# Patient Record
(demographics unavailable — no encounter records)

---

## 2024-12-02 NOTE — BEGINNING OF VISIT
[Medical reason not done] : Medical reason not done [0] : 2) Feeling down, depressed, or hopeless: Not at all (0) [[id number]] : 0 [Pain Scale: ___] : On a scale of 1-10, today the patient's pain is a(n) [unfilled]. [Future Reassessment of Pain Scale] : Future reassessment of pain scale [Former] : Former [Date Discussed (MM/DD/YY): ___] : Discussed: [unfilled] [Patient/Caregiver not ready to engage] : Patient/Caregiver not ready to engage

## 2024-12-02 NOTE — ADDENDUM
[FreeTextEntry1] : Documented by Patricio Downing acting as scribe for Dr. Herring on  12/02/2024.    All Medical record entries made by the Scribe were at my, Dr. Herring's, direction and personally dictated by me on  12/02/2024. I have reviewed the chart and agree that the record accurately reflects my personal performance of the history, physical exam, assessment and plan. I have also personally directed, reviewed, and agreed with the discharge instructions.

## 2024-12-02 NOTE — RESULTS/DATA
[FreeTextEntry1] : ACC: [id number]     EXAM:  US DPLX LWR EXT VEINS COMPL BI   ORDERED BY: CHARY COFFEY  PROCEDURE DATE:  11/27/2024 INTERPRETATION:  CLINICAL INFORMATION: PERIPHERAL EDEMA  COMPARISON: 01/12/2022.  TECHNIQUE: Duplex sonography of the BILATERAL LOWER extremity veins with color and spectral Doppler, with and without compression.  FINDINGS:  RIGHT: Normal compressibility of the RIGHT common femoral, femoral and popliteal veins. DVT noted in the right gastrocnemius vein.  LEFT: Normal compressibility of the LEFT common femoral, femoral and popliteal veins. Doppler examination shows normal spontaneous and phasic flow. No LEFT calf vein thrombosis is detected.  IMPRESSION: DVT noted in the right gastrocnemius vein.  No left-sided DVT. --- End of Report --- CASIMIRO RUEDA MD; Attending Radiologist This document has been electronically signed. Nov 27 2024  2:09PM ---------------------------------------------------------------------------------------------------------------------------------------------- ACC: [id number]     [facility]   ORDERED BY: CHARY COFFEY PROCEDURE DATE:  11/27/2024    INTERPRETATION:  CLINICAL INFORMATION: Confirmed DVT  COMPARISON: 9/4/2020  CONTRAST/COMPLICATIONS: IV Contrast: Omnipaque 350  81 cc administered   19 cc discarded Oral Contrast: NONE   PROCEDURE: CT Angiography of the Chest. Sagittal and coronal reformats were performed as well as 3D (MIP) reconstructions.  FINDINGS:  LUNGS AND LARGE AIRWAYS: Patent central airways. Low lung volumes with bibasilar dependent changes. No focal consolidation. PLEURA: No pleural effusion. VESSELS: Satisfactory bolus. No pulmonary emboli. Normal caliber thoracic aorta and pulmonary artery. HEART: Heart size is normal. No pericardial effusion. MEDIASTINUM AND MARIANN: No lymphadenopathy. CHEST WALL AND LOWER NECK: Within normal limits. VISUALIZED UPPER ABDOMEN: Cholelithiasis. Sleeve gastrectomy. Mild hazy density in the right upper abdominal mesentery with  mm sized lymph nodes consistent with nonspecific mesenteric panniculitis, similar appearance to prior BONES: Degenerative changes.  IMPRESSION: No evidence of pulmonary emboli or other acute finding  --- End of Report --- MARGO LANGLEY MD; Attending Radiologist This document has been electronically signed. Nov 27 2024  4:17PM

## 2024-12-02 NOTE — RESULTS/DATA
[FreeTextEntry1] : ACC: [id number]     EXAM:  US DPLX LWR EXT VEINS COMPL BI   ORDERED BY: CHARY COFFEY  PROCEDURE DATE:  11/27/2024 INTERPRETATION:  CLINICAL INFORMATION: PERIPHERAL EDEMA  COMPARISON: 01/12/2022.  TECHNIQUE: Duplex sonography of the BILATERAL LOWER extremity veins with color and spectral Doppler, with and without compression.  FINDINGS:  RIGHT: Normal compressibility of the RIGHT common femoral, femoral and popliteal veins. DVT noted in the right gastrocnemius vein.  LEFT: Normal compressibility of the LEFT common femoral, femoral and popliteal veins. Doppler examination shows normal spontaneous and phasic flow. No LEFT calf vein thrombosis is detected.  IMPRESSION: DVT noted in the right gastrocnemius vein.  No left-sided DVT. --- End of Report --- CASIMIRO RUEDA MD; Attending Radiologist This document has been electronically signed. Nov 27 2024  2:09PM ---------------------------------------------------------------------------------------------------------------------------------------------- ACC: [id number]     [facility]   ORDERED BY: CHARY COFFEY PROCEDURE DATE:  11/27/2024    INTERPRETATION:  CLINICAL INFORMATION: Confirmed DVT  COMPARISON: 9/4/2020  CONTRAST/COMPLICATIONS: IV Contrast: Omnipaque 350  81 cc administered   19 cc discarded Oral Contrast: NONE   PROCEDURE: CT Angiography of the Chest. Sagittal and coronal reformats were performed as well as 3D (MIP) reconstructions.  FINDINGS:  LUNGS AND LARGE AIRWAYS: Patent central airways. Low lung volumes with bibasilar dependent changes. No focal consolidation. PLEURA: No pleural effusion. VESSELS: Satisfactory bolus. No pulmonary emboli. Normal caliber thoracic aorta and pulmonary artery. HEART: Heart size is normal. No pericardial effusion. MEDIASTINUM AND MARIANN: No lymphadenopathy. CHEST WALL AND LOWER NECK: Within normal limits. VISUALIZED UPPER ABDOMEN: Cholelithiasis. Sleeve gastrectomy. Mild hazy density in the right upper abdominal mesentery with  mm sized lymph nodes consistent with nonspecific mesenteric panniculitis, similar appearance to prior BONES: Degenerative changes.  IMPRESSION: No evidence of pulmonary emboli or other acute finding  --- End of Report --- MARGO LANGLEY MD; Attending Radiologist This document has been electronically signed. Nov 27 2024  4:17PM

## 2024-12-02 NOTE — BEGINNING OF VISIT
[Medical reason not done] : Medical reason not done [0] : 2) Feeling down, depressed, or hopeless: Not at all (0) [[id number]] : 0 [Pain Scale: ___] : On a scale of 1-10, today the patient's pain is a(n) [unfilled]. [Future Reassessment of Pain Scale] : Future reassessment of pain scale [Former] : Former [Date Discussed (MM/DD/YY): ___] : Discussed: [unfilled] [Patient/Caregiver not ready to engage] : Patient/Caregiver not ready to engage

## 2024-12-02 NOTE — HISTORY OF PRESENT ILLNESS
[de-identified] : Mabel Gunter is a 61-yr old female with PMH of obesity, osteoarthritis of Right knee (H.A injection), right leg varicose vein (s/p Right leg phlebectomy 1/2017, right GSV radiofrequency ablation 3/23/22) diagnosed with Right LE DVT (11/27/24)  She presented to [facility] on 11/27/24 c/o right lower extremity pain. She reports recent  2 hour flight from [place] 2 weeks prior but noted that calf pain had started prior to the flight.  She noted right posterior medial calf pain with tenderness on Sunday, 11/24/24 with out acute trauma. And has since developed some redness distal to this with some mild increase warmth. Bilateral LE doppler showed DVT in the right gastrocnemius vein. CTA Neg. Started on Eliquis  Imaging/pertinent labs:  11/27/2024 US Duplex bilateral lower extremities-DVT noted in the right gastrocnemius vein. No left-sided DVT.  11/27/2024 CT Angio chest pulmonary arteries-No evidence of pulmonary emboli or other acute finding  11/27/24 CBC: WBC 5.67 Hgb 12.2 Hct 37.1           PT 11.6 INR 1.0 APTT 35.7          CMP: Na 144 K 3.8 Mg 2.1 bun 10 creat 0.92 AST 19 ALT 14 Bili 0.5 tp 6.5 alb 3.3 Ca  Patient presents for initial visit Has been on Eliquis since 11/27/24 She reports Dr. Matt Astorga? did stab phlebectomy on both legs years ago Pt reports hip, back, calf pain during trip in [place] from August 2024 - Oct 2024 While in [place] end of Oct 2024 was when the calf pain started, before her flight She notes she has gone on long car rides fairly often in the past Father with many blood clots, but had many other high risk behaviors such as smoking and drinking She was on birth control from [id number] Reports headaches and dizziness Denies unexplained weight loss Now, her calf pain is better Last Virtual colonoscopy 2020 Last mammo/sono July 2024 Last pap smear end of 11/2024  PMH: Varicose veins both legs, right knee osteoarthritis, Vit B12 def, Vit D def, basal cell carcinoma right leg PSH: Right leg phlebectomy 1/2017, gastrectomy (2012), Right total knee replacement (2018) FMH: Father with history of blood clots Social Hx: Former smoker, quit 2012, works as a nurse  HCM:  Care team: PCP:

## 2024-12-02 NOTE — HISTORY OF PRESENT ILLNESS
[de-identified] : Mabel Gunter is a 61-yr old female with PMH of obesity, osteoarthritis of Right knee (H.A injection), right leg varicose vein (s/p Right leg phlebectomy 1/2017, right GSV radiofrequency ablation 3/23/22) diagnosed with Right LE DVT (11/27/24)  She presented to [facility] on 11/27/24 c/o right lower extremity pain. She reports recent  2 hour flight from [place] 2 weeks prior but noted that calf pain had started prior to the flight.  She noted right posterior medial calf pain with tenderness on Sunday, 11/24/24 with out acute trauma. And has since developed some redness distal to this with some mild increase warmth. Bilateral LE doppler showed DVT in the right gastrocnemius vein. CTA Neg. Started on Eliquis  Imaging/pertinent labs:  11/27/2024 US Duplex bilateral lower extremities-DVT noted in the right gastrocnemius vein. No left-sided DVT.  11/27/2024 CT Angio chest pulmonary arteries-No evidence of pulmonary emboli or other acute finding  11/27/24 CBC: WBC 5.67 Hgb 12.2 Hct 37.1           PT 11.6 INR 1.0 APTT 35.7          CMP: Na 144 K 3.8 Mg 2.1 bun 10 creat 0.92 AST 19 ALT 14 Bili 0.5 tp 6.5 alb 3.3 Ca  Patient presents for initial visit Has been on Eliquis since 11/27/24 She reports Dr. Matt Astorga? did stab phlebectomy on both legs years ago Pt reports hip, back, calf pain during trip in [place] from August 2024 - Oct 2024 While in [place] end of Oct 2024 was when the calf pain started, before her flight She notes she has gone on long car rides fairly often in the past Father with many blood clots, but had many other high risk behaviors such as smoking and drinking She was on birth control from [id number] Reports headaches and dizziness Denies unexplained weight loss Now, her calf pain is better Last Virtual colonoscopy 2020 Last mammo/sono July 2024 Last pap smear end of 11/2024  PMH: Varicose veins both legs, right knee osteoarthritis, Vit B12 def, Vit D def, basal cell carcinoma right leg PSH: Right leg phlebectomy 1/2017, gastrectomy (2012), Right total knee replacement (2018) FMH: Father with history of blood clots Social Hx: Former smoker, quit 2012, works as a nurse  HCM:  Care team: PCP:

## 2024-12-02 NOTE — ASSESSMENT
[FreeTextEntry1] : 61 yr old female with PMH of obesity, right varicose vein (s/p Right leg phlebectomy 1/2017, right GSV radiofrequency ablation 3/23/22) diagnosed with Right gastrocnemius DVT / below the knee  Discussed patient's medical history extensively. She has h/o varicose veins. Father with h/o blood clots. She is a former smoker.  With no clear provoking events, this appears to be an unprovoked DVT as she had developed right calf pain prior to  her flight from [place]. Currently on Eliquis 10 mg BID, will be switched to Eliquis 5 mg ~12/9/24. Discussed that she should continue Eliquis for at least 3 months.   Plan: -hypercoagulable work up -continue Eliquis  -RTO in 3 months

## 2025-07-17 NOTE — HISTORY OF PRESENT ILLNESS
[de-identified] : Mabel Gunter is a 61-yr old female with PMH of obesity, osteoarthritis of Right knee (H.A injection), right leg varicose vein (s/p Right leg phlebectomy 1/2017, right GSV radiofrequency ablation 3/23/22) diagnosed with Right LE DVT (11/27/24)  She presented to [facility] on 11/27/24 c/o right lower extremity pain. She reports recent  2 hour flight from [place] 2 weeks prior but noted that calf pain had started prior to the flight.  She noted right posterior medial calf pain with tenderness on Sunday, 11/24/24 with out acute trauma. And has since developed some redness distal to this with some mild increase warmth. Bilateral LE doppler showed DVT in the right gastrocnemius vein. CTA Neg. Started on Eliquis  Imaging/pertinent labs:  11/27/2024 US Duplex bilateral lower extremities-DVT noted in the right gastrocnemius vein. No left-sided DVT.  11/27/2024 CT Angio chest pulmonary arteries-No evidence of pulmonary emboli or other acute finding  11/27/24 CBC: WBC 5.67 Hgb 12.2 Hct 37.1           PT 11.6 INR 1.0 APTT 35.7          CMP: Na 144 K 3.8 Mg 2.1 bun 10 creat 0.92 AST 19 ALT 14 Bili 0.5 tp 6.5 alb 3.3 Ca  Patient presents for initial visit Has been on Eliquis since 11/27/24 She reports Dr. Matt Astorga? did stab phlebectomy on both legs years ago Pt reports hip, back, calf pain during trip in [place] from August 2024 - Oct 2024 While in [place] end of Oct 2024 was when the calf pain started, before her flight She notes she has gone on long car rides fairly often in the past Father with many blood clots, but had many other high risk behaviors such as smoking and drinking She was on birth control from [id number] Reports headaches and dizziness Denies unexplained weight loss Now, her calf pain is better Last Virtual colonoscopy 2020 Last mammo/sono July 2024 Last pap smear end of 11/2024  PMH: Varicose veins both legs, right knee osteoarthritis, Vit B12 def, Vit D def, basal cell carcinoma right leg PSH: Right leg phlebectomy 1/2017, gastrectomy (2012), Right total knee replacement (2018) FMH: Father with history of blood clots Social Hx: Former smoker, quit 2012, works as a nurse  HCM:  Care team: PCP: [de-identified] : Patient presents for follow up.  She recently had a consultation for hip replacement surgery, and is seeing rheumatologist this morning She has stopped Eliquis since June 2025

## 2025-07-17 NOTE — ASSESSMENT
[FreeTextEntry1] : 61 yr old female with PMH of obesity, right varicose vein (s/p Right leg phlebectomy 1/2017, right GSV radiofrequency ablation 3/23/22) diagnosed with Right gastrocnemius DVT / below the knee She has h/o varicose veins. Father with h/o blood clots. She is a former smoker.  With no clear provoking events, this appears to be an unprovoked DVT as she had developed right calf pain prior to her flight from [place]. On Eliquis 5 mg 12/9/24-6/2025 and then self d/c'd.   Reviewed hypercoag work up: Negative for FVL / PT gene mutation, AT III, protein C & S normal Ongoing risk factors include obesity, sedentary work - sits for long hours  Plan: -Discussed resuming eliquis 5 mg BID ongoing given unprovoked event and persistent risk factors. Agreeable to resume but doesn't want to be on it life long -labs today - d-dimer, protein S antigen -planning for hip replacement - she may call within 1 month of surgery for clearance  -RTO in 1 year

## 2025-07-17 NOTE — RESULTS/DATA
[FreeTextEntry1] : ACC: [id number]     EXAM:  US DPLX LWR EXT VEINS COMPL BI   ORDERED BY: CHARY COFFEY  PROCEDURE DATE:  11/27/2024 INTERPRETATION:  CLINICAL INFORMATION: PERIPHERAL EDEMA  COMPARISON: 01/12/2022.  TECHNIQUE: Duplex sonography of the BILATERAL LOWER extremity veins with color and spectral Doppler, with and without compression.  FINDINGS:  RIGHT: Normal compressibility of the RIGHT common femoral, femoral and popliteal veins. DVT noted in the right gastrocnemius vein.  LEFT: Normal compressibility of the LEFT common femoral, femoral and popliteal veins. Doppler examination shows normal spontaneous and phasic flow. No LEFT calf vein thrombosis is detected.  IMPRESSION: DVT noted in the right gastrocnemius vein.  No left-sided DVT. --- End of Report --- CASIMIRO UREDA MD; Attending Radiologist This document has been electronically signed. Nov 27 2024  2:09PM ---------------------------------------------------------------------------------------------------------------------------------------------- ACC: [id number]     [facility]  [facility]   ORDERED BY: CHARY COFFEY PROCEDURE DATE:  11/27/2024    INTERPRETATION:  CLINICAL INFORMATION: Confirmed DVT  COMPARISON: 9/4/2020  CONTRAST/COMPLICATIONS: IV Contrast: Omnipaque 350  81 cc administered   19 cc discarded Oral Contrast: NONE   PROCEDURE: CT Angiography of the Chest. Sagittal and coronal reformats were performed as well as 3D (MIP) reconstructions.  FINDINGS:  LUNGS AND LARGE AIRWAYS: Patent central airways. Low lung volumes with bibasilar dependent changes. No focal consolidation. PLEURA: No pleural effusion. VESSELS: Satisfactory bolus. No pulmonary emboli. Normal caliber thoracic aorta and pulmonary artery. HEART: Heart size is normal. No pericardial effusion. MEDIASTINUM AND MARIANN: No lymphadenopathy. CHEST WALL AND LOWER NECK: Within normal limits. VISUALIZED UPPER ABDOMEN: Cholelithiasis. Sleeve gastrectomy. Mild hazy density in the right upper abdominal mesentery with  mm sized lymph nodes consistent with nonspecific mesenteric panniculitis, similar appearance to prior BONES: Degenerative changes.  IMPRESSION: No evidence of pulmonary emboli or other acute finding  --- End of Report --- MARGO LANGLEY MD; Attending Radiologist This document has been electronically signed. Nov 27 2024  4:17PM